# Patient Record
Sex: FEMALE | Race: WHITE | NOT HISPANIC OR LATINO
[De-identification: names, ages, dates, MRNs, and addresses within clinical notes are randomized per-mention and may not be internally consistent; named-entity substitution may affect disease eponyms.]

---

## 2023-05-10 ENCOUNTER — APPOINTMENT (OUTPATIENT)
Dept: DERMATOLOGY | Facility: CLINIC | Age: 53
End: 2023-05-10
Payer: COMMERCIAL

## 2023-05-10 VITALS — BODY MASS INDEX: 21.51 KG/M2 | WEIGHT: 126 LBS | HEIGHT: 64 IN

## 2023-05-10 PROBLEM — Z00.00 ENCOUNTER FOR PREVENTIVE HEALTH EXAMINATION: Status: ACTIVE | Noted: 2023-05-10

## 2023-05-10 PROCEDURE — 99203 OFFICE O/P NEW LOW 30 MIN: CPT

## 2023-05-11 NOTE — ASSESSMENT
[FreeTextEntry1] : # Hyperpigmented patches with erythema and focal erosion\par - Discussed that I am concerned her current presentation and exam is showing post-peel erythema/erosions in addition to underlying hyperpigmentation disorder\par - Recommend stopping all at home treatments and HQ cream and allowing skin to heal to allow for accurate evaluation\par - If erythema/erosions still present, would consider alternative diagnoses including CTD\par - Other ddx includes melasma, exogenous ochronosis from HQ use, and lichen planus pigmentosus\par - Return in 2 weeks for re-evaluation and likely small skin biopsy\par \par

## 2023-05-11 NOTE — PHYSICAL EXAM
[Alert] : alert [Oriented x 3] : ~L oriented x 3 [FreeTextEntry3] : Focused exam performed. Findings notable for:\par \par Brown-grey patches with erythema and focal erosions on forehead, malar cheeks

## 2023-05-11 NOTE — HISTORY OF PRESENT ILLNESS
[FreeTextEntry1] : Melasma - NPA [de-identified] : # Concerning Dark Spots\par Location: face \par Duration: 3 years; occurred after a pregnancy\par Symptoms: itch\par Treatments: HQ4% which has helped; zia laser (last done a few months ago); she is also doing at home chemical peels which has irritated her skin. last peel was 1 week ago.\par Had a biopsy done years ago which showed melasma, but does not have record\par She is very distressed by the appearance of her skin\par Wears ISDIN sunscreen\par \par Here today with  [place] used [place] for [employer] [place] [facility] [place]ona and Dr. Dinh.\par \par Assistance in translation provided by Dahiana Velasco at request of patient

## 2023-05-25 ENCOUNTER — APPOINTMENT (OUTPATIENT)
Dept: DERMATOLOGY | Facility: CLINIC | Age: 53
End: 2023-05-25
Payer: COMMERCIAL

## 2023-05-25 VITALS — WEIGHT: 126 LBS | HEIGHT: 64 IN | BODY MASS INDEX: 21.51 KG/M2

## 2023-05-25 PROCEDURE — 11104 PUNCH BX SKIN SINGLE LESION: CPT

## 2023-05-25 NOTE — HISTORY OF PRESENT ILLNESS
[FreeTextEntry1] : Melasma - RPA [de-identified] : Here today for biopsy\par See prior note for details

## 2023-05-25 NOTE — ASSESSMENT
[FreeTextEntry1] : # Hyperpigmented patches with erythema and focal erosion\par \par Biopsy by [facility]\par The risks/benefits/alternatives of skin biopsy were explained to the patient. Patient expressed understanding of these risks and provided consent to the procedure. Time out with verification of patient and lesion site was performed. Site was prepped with rubbing alcohol, lidocaine with epinephrine was injected for anesthesia, and biopsy was performed. Specimen sent to path. Nylon suture placed for hemostasis. Procedure was without complication and well tolerated. Wound care was discussed.\par \par FU 1 week

## 2023-06-01 ENCOUNTER — APPOINTMENT (OUTPATIENT)
Dept: DERMATOLOGY | Facility: CLINIC | Age: 53
End: 2023-06-01
Payer: COMMERCIAL

## 2023-06-01 DIAGNOSIS — R21 RASH AND OTHER NONSPECIFIC SKIN ERUPTION: ICD-10-CM

## 2023-06-01 PROCEDURE — 99214 OFFICE O/P EST MOD 30 MIN: CPT

## 2023-06-01 NOTE — ASSESSMENT
[FreeTextEntry1] : # Hyperpigmented patches with erythema and focal erosion\par - Previous biopsy showed melasma; most recent biopsy with superficial scar and dermal melanophages\par - Likely a combination of PIH/scar 2/2 at home peel as well as dermal melasma\par - Recommend consultation with laser specialist to determine laser options\par - Start PO  mg divided BID, trial for 2 months to see if improvement\par - We reviewed SE; understand clotting risks (not on any meds, no hx of blood clots in herself and her family)\par - Strict sun protection\par \par FU 2 months\par \par

## 2023-06-01 NOTE — HISTORY OF PRESENT ILLNESS
[FreeTextEntry1] : Hyperpigmentation - RPA [de-identified] : # Concerning Dark Spots\par Location: face \par Duration: 3 years; occurred after a pregnancy\par Symptoms: Itch\par Treatments: HQ4% which has helped; zia laser (last done a few months ago, was done at a [facility]; she is also doing at home chemical peels which has irritated her skin. last peel was 1 week ago.\par Had a biopsy done years ago which showed melasma, but does not have record\par She is very distressed by the appearance of her skin\par Wears ISDIN sunscreen and sun protective clothing\par Biopsy done at last visit showed scar and dermal melanophages\par

## 2023-08-16 ENCOUNTER — APPOINTMENT (OUTPATIENT)
Dept: DERMATOLOGY | Facility: CLINIC | Age: 53
End: 2023-08-16

## 2023-08-16 ENCOUNTER — APPOINTMENT (OUTPATIENT)
Dept: DERMATOLOGY | Facility: CLINIC | Age: 53
End: 2023-08-16
Payer: COMMERCIAL

## 2023-08-16 VITALS — WEIGHT: 132 LBS | BODY MASS INDEX: 22.53 KG/M2 | HEIGHT: 64 IN

## 2023-08-16 PROCEDURE — 99214 OFFICE O/P EST MOD 30 MIN: CPT

## 2023-08-16 RX ORDER — TRANEXAMIC ACID 650 MG/1
650 TABLET ORAL
Qty: 90 | Refills: 0 | Status: ACTIVE | COMMUNITY
Start: 2023-06-01 | End: 1900-01-01

## 2023-08-16 NOTE — HISTORY OF PRESENT ILLNESS
[FreeTextEntry1] : Melasma - RPA [de-identified] : Location: face Duration: 3 years; occurred after a pregnancy Symptoms: Itch Treatments: HQ4% which has helped; [facility]; she is also doing at home chemical peels which has irritated her skin) Had a biopsy done years ago which showed melasma, but does not have record She is very distressed by the appearance of her skin Wears ISDIN sunscreen and sun protective clothing Biopsy done at prior visit showed scar and dermal melanophages S/p 2 months of PO TXA with some improvement Also saw Dr. Norris; will consider lasers in the fall/winter  Denies blood clots, SOB.

## 2023-08-16 NOTE — PHYSICAL EXAM
[Alert] : alert [Oriented x 3] : ~L oriented x 3 [FreeTextEntry3] : Focused exam performed. Findings notable for:  Brown-grey patches with erythema on forehead, malar cheeks; interval improvement

## 2023-08-16 NOTE — ASSESSMENT
[FreeTextEntry1] : # Hyperpigmented patches with erythema and focal erosion - Previous biopsy showed melasma; most recent biopsy with superficial scar and dermal melanophages - Likely a combination of PIH/scar 2/2 at home peel as well as dermal melasma - Continue PO  mg divided BID for additional 3 months; then will either d/c or reduce dose - We reviewed SE; understand clotting risks (not on any meds, no hx of blood clots in herself and her family) - Wear compression stockings during flight - Strict sun protection  FU 3 months

## 2023-10-18 ENCOUNTER — APPOINTMENT (OUTPATIENT)
Dept: DERMATOLOGY | Facility: CLINIC | Age: 53
End: 2023-10-18
Payer: COMMERCIAL

## 2023-10-18 DIAGNOSIS — L70.0 ACNE VULGARIS: ICD-10-CM

## 2023-10-18 DIAGNOSIS — L81.1 CHLOASMA: ICD-10-CM

## 2023-10-18 PROCEDURE — 99213 OFFICE O/P EST LOW 20 MIN: CPT

## 2023-11-09 ENCOUNTER — APPOINTMENT (OUTPATIENT)
Dept: DERMATOLOGY | Facility: CLINIC | Age: 53
End: 2023-11-09